# Patient Record
Sex: FEMALE | Race: OTHER | NOT HISPANIC OR LATINO
[De-identification: names, ages, dates, MRNs, and addresses within clinical notes are randomized per-mention and may not be internally consistent; named-entity substitution may affect disease eponyms.]

---

## 2022-06-14 ENCOUNTER — APPOINTMENT (OUTPATIENT)
Dept: OBGYN | Facility: CLINIC | Age: 67
End: 2022-06-14
Payer: MEDICAID

## 2022-06-14 ENCOUNTER — RESULT REVIEW (OUTPATIENT)
Age: 67
End: 2022-06-14

## 2022-06-14 VITALS — WEIGHT: 189 LBS | SYSTOLIC BLOOD PRESSURE: 136 MMHG | BODY MASS INDEX: 31.45 KG/M2 | DIASTOLIC BLOOD PRESSURE: 73 MMHG

## 2022-06-14 DIAGNOSIS — Z01.419 ENCOUNTER FOR GYNECOLOGICAL EXAMINATION (GENERAL) (ROUTINE) W/OUT ABNORMAL FINDINGS: ICD-10-CM

## 2022-06-14 PROCEDURE — 99387 INIT PM E/M NEW PAT 65+ YRS: CPT

## 2022-06-15 LAB — HPV HIGH+LOW RISK DNA PNL CVX: NOT DETECTED

## 2022-06-17 ENCOUNTER — APPOINTMENT (OUTPATIENT)
Dept: MAMMOGRAPHY | Facility: IMAGING CENTER | Age: 67
End: 2022-06-17
Payer: MEDICAID

## 2022-06-17 ENCOUNTER — OUTPATIENT (OUTPATIENT)
Dept: OUTPATIENT SERVICES | Facility: HOSPITAL | Age: 67
LOS: 1 days | End: 2022-06-17
Payer: MEDICAID

## 2022-06-17 ENCOUNTER — RESULT REVIEW (OUTPATIENT)
Age: 67
End: 2022-06-17

## 2022-06-17 DIAGNOSIS — Z01.419 ENCOUNTER FOR GYNECOLOGICAL EXAMINATION (GENERAL) (ROUTINE) WITHOUT ABNORMAL FINDINGS: ICD-10-CM

## 2022-06-17 LAB — CYTOLOGY CVX/VAG DOC THIN PREP: NORMAL

## 2022-06-17 PROCEDURE — 77067 SCR MAMMO BI INCL CAD: CPT

## 2022-06-17 PROCEDURE — 77063 BREAST TOMOSYNTHESIS BI: CPT | Mod: 26,52

## 2022-06-17 PROCEDURE — 77063 BREAST TOMOSYNTHESIS BI: CPT

## 2022-06-17 PROCEDURE — 77067 SCR MAMMO BI INCL CAD: CPT | Mod: 26,52

## 2022-06-20 RX ORDER — LEVOTHYROXINE SODIUM 0.17 MG/1
TABLET ORAL
Refills: 0 | Status: ACTIVE | COMMUNITY

## 2022-06-20 NOTE — PHYSICAL EXAM
[Chaperone Present] : A chaperone was present in the examining room during all aspects of the physical examination [Appropriately responsive] : appropriately responsive [Alert] : alert [No Acute Distress] : no acute distress [Soft] : soft [Non-tender] : non-tender [Non-distended] : non-distended [No HSM] : No HSM [No Lesions] : no lesions [No Mass] : no mass [Oriented x3] : oriented x3 [FreeTextEntry3] : supple [FreeTextEntry5] : Normal respiratory effort [Examination Of The Breasts] : a normal appearance [Left Breast Absent] : a total mastectomy [No Masses] : no breast masses were palpable [Labia Majora] : normal [Labia Minora] : normal [Normal] : normal [Uterine Adnexae] : normal

## 2022-06-20 NOTE — PLAN
[FreeTextEntry1] : 65 y/o P5 presenting for annual exam\par -f/u pap and GC/CT\par -Requisition given for mammogram. DIscussed importance of annual screening especially with history of breast cancer\par -Colon Cancer screening - discussed that this is usually started by age 50. Patient and daughter would like to discuss further with sons/brothers. Offered referral to PCP. Patient would like to go to her daughter's PCP \par -f/u PRN\par

## 2022-06-20 NOTE — HISTORY OF PRESENT ILLNESS
[postmenopausal] : postmenopausal [N] : Patient is not sexually active [Y] : Positive pregnancy history [FreeTextEntry1] : Patient is a 65 y/o  presenting to establish care. She reports that her LMP was in 2002 and denies any vaginal bleeding since then. She is feeling well and is without complaints. She denies vaginal itching, odor and discharge. Denies urinary urgency, frequency and dysuria.\par She has a history of breast cancer which was diagnosed around age 41. She is s/p chemo/RT and maintenance medication for 5 years. She has not had a mammogram in over 3-4 years. She also has a history of HTN, hypothyroidism and GERD. Of note, she does not have a PCP because her two sons are physicians and typically manage all of her health care.\par \par OB Hx:\par C/Sx1 (twins - both demised)\par SVDx6\par \par GYN Hx:\par Unsure of last pap\par Denies history of abnormal paps or other GYN issues\par Not currently sexually active [Mammogramdate] : 2018 [PapSmeardate] : unsure [ColonoscopyDate] : never [LMPDate] : 2002 [PGxTotal] : 8 [facility]

## 2025-09-24 PROBLEM — R63.4 WEIGHT LOSS: Status: ACTIVE | Noted: 2025-09-24
